# Patient Record
Sex: MALE | URBAN - METROPOLITAN AREA
[De-identification: names, ages, dates, MRNs, and addresses within clinical notes are randomized per-mention and may not be internally consistent; named-entity substitution may affect disease eponyms.]

---

## 2021-06-24 ENCOUNTER — IMMUNIZATION (OUTPATIENT)
Dept: LAB | Facility: HOSPITAL | Age: 26
End: 2021-06-24
Attending: EMERGENCY MEDICINE
Payer: COMMERCIAL

## 2021-06-24 DIAGNOSIS — Z23 NEED FOR VACCINATION: Primary | ICD-10-CM

## 2021-06-24 PROCEDURE — 0001A SARSCOV2 VAC 30MCG/0.3ML IM: CPT

## 2021-07-15 ENCOUNTER — IMMUNIZATION (OUTPATIENT)
Dept: LAB | Facility: HOSPITAL | Age: 26
End: 2021-07-15
Attending: EMERGENCY MEDICINE
Payer: COMMERCIAL

## 2021-07-15 DIAGNOSIS — Z23 NEED FOR VACCINATION: Primary | ICD-10-CM

## 2021-07-15 PROCEDURE — 0002A SARSCOV2 VAC 30MCG/0.3ML IM: CPT

## 2022-06-07 ENCOUNTER — TELEPHONE (OUTPATIENT)
Dept: INTERNAL MEDICINE | Age: 27
End: 2022-06-07

## 2022-06-07 ENCOUNTER — OFFICE VISIT (OUTPATIENT)
Dept: INTERNAL MEDICINE CLINIC | Facility: CLINIC | Age: 27
End: 2022-06-07
Payer: COMMERCIAL

## 2022-06-07 VITALS
HEART RATE: 100 BPM | OXYGEN SATURATION: 98 % | TEMPERATURE: 99 F | SYSTOLIC BLOOD PRESSURE: 122 MMHG | RESPIRATION RATE: 16 BRPM | BODY MASS INDEX: 24.44 KG/M2 | DIASTOLIC BLOOD PRESSURE: 86 MMHG | WEIGHT: 165 LBS | HEIGHT: 69 IN

## 2022-06-07 DIAGNOSIS — R07.89 MUSCULAR CHEST PAIN: ICD-10-CM

## 2022-06-07 DIAGNOSIS — Z13.0 SCREENING, IRON DEFICIENCY ANEMIA: ICD-10-CM

## 2022-06-07 DIAGNOSIS — Z13.89 SCREENING FOR GENITOURINARY CONDITION: ICD-10-CM

## 2022-06-07 DIAGNOSIS — Z13.220 LIPID SCREENING: ICD-10-CM

## 2022-06-07 DIAGNOSIS — Z00.00 ANNUAL PHYSICAL EXAM: Primary | ICD-10-CM

## 2022-06-07 DIAGNOSIS — Z00.00 LABORATORY EXAMINATION ORDERED AS PART OF A ROUTINE GENERAL MEDICAL EXAMINATION: ICD-10-CM

## 2022-06-07 DIAGNOSIS — Z13.29 THYROID DISORDER SCREEN: ICD-10-CM

## 2022-06-07 PROCEDURE — 3074F SYST BP LT 130 MM HG: CPT | Performed by: INTERNAL MEDICINE

## 2022-06-07 PROCEDURE — 3008F BODY MASS INDEX DOCD: CPT | Performed by: INTERNAL MEDICINE

## 2022-06-07 PROCEDURE — 3079F DIAST BP 80-89 MM HG: CPT | Performed by: INTERNAL MEDICINE

## 2022-06-07 PROCEDURE — 99385 PREV VISIT NEW AGE 18-39: CPT | Performed by: INTERNAL MEDICINE

## 2022-06-07 PROCEDURE — 96127 BRIEF EMOTIONAL/BEHAV ASSMT: CPT | Performed by: INTERNAL MEDICINE

## 2022-06-07 RX ORDER — NAPROXEN 500 MG/1
500 TABLET ORAL 2 TIMES DAILY WITH MEALS
Qty: 20 TABLET | Refills: 0 | Status: SHIPPED | OUTPATIENT
Start: 2022-06-07

## 2022-06-09 LAB
AMB EXT BILIRUBIN URINE: NEGATIVE
AMB EXT BILIRUBIN, TOTAL: 0.8 MG/DL
AMB EXT BLOOD URINE: NEGATIVE
AMB EXT BUN: 18 MG/DL
AMB EXT CALCIUM: 10.1
AMB EXT CARBON DIOXIDE: 24
AMB EXT CHLORIDE: 101
AMB EXT CHOLESTEROL, TOTAL: 168 MG/DL
AMB EXT CMP ALT: 13 U/L
AMB EXT CMP AST: 13 U/L
AMB EXT CREATININE: 0.94 MG/DL
AMB EXT GLUCOSE URINE: NEGATIVE
AMB EXT GLUCOSE: 95 MG/DL
AMB EXT HDL CHOLESTEROL: 43 MG/DL
AMB EXT HEMATOCRIT: 50.6
AMB EXT HEMOGLOBIN: 17.1
AMB EXT HGBA1C: 5.4 %
AMB EXT LDL CHOLESTEROL, DIRECT: 111 MG/DL
AMB EXT MCV: 87
AMB EXT NITRITE URINE: NEGATIVE
AMB EXT PH URINE: 6.5
AMB EXT PLATELETS: 277
AMB EXT POSTASSIUM: 4.5 MMOL/L
AMB EXT PROTEIN URINE: NEGATIVE
AMB EXT SODIUM: 140 MMOL/L
AMB EXT TOTAL PROTEIN: 7.7
AMB EXT TRIGLYCERIDES: 71 MG/DL
AMB EXT TSH: 0.38 MIU/ML
AMB EXT URINE CLARITY: CLEAR
AMB EXT URINE COLOR: YELLOW
AMB EXT URINE SPECIFIC GRAVITY: 1.03
AMB EXT UROBILINOGEN URINE: 1
AMB EXT WBC: 5.8 X10(3)UL

## 2022-06-10 ENCOUNTER — APPOINTMENT (OUTPATIENT)
Dept: INTERNAL MEDICINE | Age: 27
End: 2022-06-10

## 2022-06-10 PROBLEM — E05.90 SUBCLINICAL HYPERTHYROIDISM: Status: ACTIVE | Noted: 2022-06-10

## 2022-06-14 ENCOUNTER — TELEPHONE (OUTPATIENT)
Dept: INTERNAL MEDICINE CLINIC | Facility: CLINIC | Age: 27
End: 2022-06-14

## 2022-06-14 NOTE — TELEPHONE ENCOUNTER
Follow up to conversation w/ Dr. Mendy Lozada on 6/7/ office visit. Pt is requesting Note for Work about   Limited activity due to injury in left pectoral muscle. Returned to work and pt said employer would work with him but they had him return to full work load.     Please call pt when letter for work is ready for

## 2022-06-14 NOTE — TELEPHONE ENCOUNTER
Per patient, after initial visit with Dr. Ashley Rivers on June 7th patient took the next two following nights off of work. Upon going back to work at a [employer] he resumed his job duties as normal.    Patient states his has exacerbated his muscle strain pain as he is lifting and transporting very heavy packages. Patient is requesting a limited activity letter to be created where patient can only lift up to ten pounds at this time. Informed patient we can create this limited activity communication letter for . Will date the letter for effective limited activity request for one week time, however if symptoms persist patient will need to contact office for further evaluation if muscle strain has not resolved. Patient verbalized understanding and was notified letter is ready for  at his convenience.

## 2022-06-30 ENCOUNTER — OFFICE VISIT (OUTPATIENT)
Dept: INTERNAL MEDICINE CLINIC | Facility: CLINIC | Age: 27
End: 2022-06-30
Payer: COMMERCIAL

## 2022-06-30 VITALS
TEMPERATURE: 99 F | DIASTOLIC BLOOD PRESSURE: 82 MMHG | BODY MASS INDEX: 24.59 KG/M2 | SYSTOLIC BLOOD PRESSURE: 118 MMHG | OXYGEN SATURATION: 98 % | HEIGHT: 69 IN | RESPIRATION RATE: 16 BRPM | WEIGHT: 166 LBS | HEART RATE: 82 BPM

## 2022-06-30 DIAGNOSIS — R10.32 LLQ PAIN: Primary | ICD-10-CM

## 2022-06-30 PROCEDURE — 99214 OFFICE O/P EST MOD 30 MIN: CPT | Performed by: INTERNAL MEDICINE

## 2022-06-30 PROCEDURE — 3074F SYST BP LT 130 MM HG: CPT | Performed by: INTERNAL MEDICINE

## 2022-06-30 PROCEDURE — 3008F BODY MASS INDEX DOCD: CPT | Performed by: INTERNAL MEDICINE

## 2022-06-30 PROCEDURE — 3079F DIAST BP 80-89 MM HG: CPT | Performed by: INTERNAL MEDICINE

## 2022-07-07 ENCOUNTER — HOSPITAL ENCOUNTER (OUTPATIENT)
Dept: CT IMAGING | Facility: HOSPITAL | Age: 27
Discharge: HOME | End: 2022-07-07
Attending: INTERNAL MEDICINE
Payer: COMMERCIAL

## 2022-07-07 DIAGNOSIS — R10.32 LLQ PAIN: ICD-10-CM

## 2022-07-07 PROCEDURE — 74177 CT ABD & PELVIS W/CONTRAST: CPT | Performed by: INTERNAL MEDICINE

## 2025-03-20 NOTE — PROGRESS NOTES
Wellness Exam    CC: Patient is presenting for a wellness exam    HPI:   Current Complaints:   Left lower chest wall pain x few weeks. Pt states the pain is dull, worse with movement. It does not radiate. Pt does have a physical job that involves heavy lifting. Has not tried anything over the counter. Denies any N/V, blood/mucus in stool, change in bowel movements.     Pertinent Family History:   Family History   Problem Relation Age of Onset    Diabetes Mother         Last Health Maintenance Exam: 2022  Colonoscopy:  No recommendations at this time   PSA:  There are no preventive care reminders to display for this patient.   Reported Health: good    No past medical history on file.  No past surgical history on file.  Social History     Socioeconomic History    Marital status: Life Partner   Tobacco Use    Smoking status: Former    Smokeless tobacco: Never   Vaping Use    Vaping status: Never Used   Substance and Sexual Activity    Alcohol use: Yes     Comment: Rare     Drug use: Not Currently     Types: Cannabis     Comment: Carts     Social Drivers of Health      Received from [facility]    Housing Stability     Medications Ordered Prior to Encounter[1]    Review of Systems   Constitutional: Negative for fever, chills and fatigue.   HENT: Negative for hearing loss, congestion, sore throat and neck pain.    Eyes: Negative for pain and visual disturbance.   Respiratory: Negative for cough and shortness of breath.    Cardiovascular: Negative for chest pain and palpitations.   Gastrointestinal: Negative for nausea, vomiting, abdominal pain and diarrhea.   Genitourinary: Negative for urgency, frequency and difficulty urinating.   Musculoskeletal: Negative for arthralgias and gait problem. + chest wall pain  Skin: Negative for color change and rash.   Neurological: Negative for tremors, weakness and numbness.   Hematological: Negative for adenopathy. Does not bruise/bleed easily.    Psychiatric/Behavioral: Negative for confusion and agitation. The patient is not nervous/anxious.      /80   Pulse 80   Temp 98.4 °F (36.9 °C)   Resp 16   Ht 5' 9\" (1.753 m)   Wt 158 lb (71.7 kg)   SpO2 99%   BMI 23.33 kg/m²   Physical Exam   Constitutional: He is oriented to person, place, and time. He appears well-developed. No distress.   HENT: Normocephalic and atraumatic. Nose normal. TMs pearly gray, + light reflex.  Mucous membranes moist, dentition normal.  Oropharynx without erythema, exudate or tonsillar hypertrophy  Eyes: EOM are normal. Pupils are equal, round, and reactive to light. No scleral icterus. Fundoscopic exam: Red reflex b/l. No exudates, hemorrhages, a/v nicking, or papilledema seen b/l.  Neck: Normal range of motion. No thyromegaly present.   Cardiovascular: Normal rate, regular rhythm and normal heart sounds.  Exam reveals no friction rub, no murmur heard.  Pulmonary/Chest: Effort normal and breath sounds normal b/l. He has no wheezes or rales.   Abdominal: Soft. Bowel sounds are normal. There is no tenderness. No HSM.  Abdominal aorta normal in size, no hernia appreciated.  Musculoskeletal: Normal range of motion. He exhibits no edema. +Reproducible pain at the left lower chest wall on palpation  Lymphadenopathy: He has no cervical or supraclavicular adenopathy.   : deferred  Neurological: He is alert and oriented to person, place, and time.  DTRs +2 and symmetric b/l.   Skin: Skin is warm. No rash noted. No erythema, pallor or jaundice.   Psychiatric: He has a normal mood and affect. His behavior is normal.       Assessment and Plan:  Emmanuel Reyes is a 29 year old male here for a wellness exam  Age appropriate cancer screening, labs, safety, immunizations were discussed with the patient and ordered as follows:    Hamzah was seen today for abdominal pain.    Diagnoses and all orders for this visit:    Annual physical exam    Laboratory exam ordered as part of routine  general medical examination  -     CBC With Differential With Platelet  -     Comp Metabolic Panel (14)  -     Lipid Panel  -     TSH W Reflex To Free T4; Future  -     Urinalysis, Routine    Encounter for vitamin deficiency screening  -     Vitamin D; Future    Muscular chest pain  -     naproxen 500 MG Oral Tab; Take 1 tablet (500 mg total) by mouth 2 (two) times daily with meals.   -if pain persist after the treatment pt is instructed to return to the office.   Pt declines TDAP vaccine at this visit.  Orders Placed This Encounter   Procedures    CBC With Differential With Platelet    Comp Metabolic Panel (14)    Lipid Panel    TSH W Reflex To Free T4     Standing Status:   Future     Standing Expiration Date:   3/16/2026    Vitamin D     Standing Status:   Future     Standing Expiration Date:   3/21/2026     Order Specific Question:   Please pick the scenario that best fits the purpose for ordering this test     Answer:   General Screening/Vit D deficiency (25-Hydroxy)     Order Specific Question:   Release to patient     Answer:   Immediate    Urinalysis, Routine      Health Maintenance Due   Topic Date Due    DTaP,Tdap,and Td Vaccines (7 - Td or Tdap) 07/22/2018    Annual Physical  06/07/2023    COVID-19 Vaccine (3 - 2024-25 season) 09/01/2024    Influenza Vaccine (1) Never done    Annual Depression Screening  Never done       His 5 year prevention plan includes: annual physical and labs. 30 mins exercise most days of the week and heart healthy diet   Patient/Caregiver Education:  Patient/Caregiver Education: There are no barriers to learning. Medical education done.  Outcome: Patient verbalizes understanding.      Return in 12m for annual physical.  Educated by: MICHAEL Hernandez           [1]   No current outpatient medications on file prior to visit.     No current facility-administered medications on file prior to visit.

## 2025-03-21 ENCOUNTER — OFFICE VISIT (OUTPATIENT)
Dept: INTERNAL MEDICINE CLINIC | Facility: CLINIC | Age: 30
End: 2025-03-21
Payer: COMMERCIAL

## 2025-03-21 ENCOUNTER — TELEPHONE (OUTPATIENT)
Dept: INTERNAL MEDICINE CLINIC | Facility: CLINIC | Age: 30
End: 2025-03-21

## 2025-03-21 VITALS
BODY MASS INDEX: 23.4 KG/M2 | HEART RATE: 80 BPM | HEIGHT: 69 IN | SYSTOLIC BLOOD PRESSURE: 120 MMHG | TEMPERATURE: 98 F | OXYGEN SATURATION: 99 % | DIASTOLIC BLOOD PRESSURE: 80 MMHG | WEIGHT: 158 LBS | RESPIRATION RATE: 16 BRPM

## 2025-03-21 DIAGNOSIS — Z00.00 LABORATORY EXAM ORDERED AS PART OF ROUTINE GENERAL MEDICAL EXAMINATION: Primary | ICD-10-CM

## 2025-03-21 DIAGNOSIS — R07.89 MUSCULAR CHEST PAIN: ICD-10-CM

## 2025-03-21 DIAGNOSIS — E55.9 VITAMIN D DEFICIENCY: ICD-10-CM

## 2025-03-21 DIAGNOSIS — Z28.21 REFUSES TETANUS, DIPHTHERIA, AND ACELLULAR PERTUSSIS (TDAP) VACCINATION: ICD-10-CM

## 2025-03-21 DIAGNOSIS — Z13.21 ENCOUNTER FOR VITAMIN DEFICIENCY SCREENING: ICD-10-CM

## 2025-03-21 DIAGNOSIS — Z00.00 LABORATORY EXAM ORDERED AS PART OF ROUTINE GENERAL MEDICAL EXAMINATION: ICD-10-CM

## 2025-03-21 DIAGNOSIS — Z00.00 ANNUAL PHYSICAL EXAM: Primary | ICD-10-CM

## 2025-03-21 RX ORDER — NAPROXEN 500 MG/1
500 TABLET ORAL 2 TIMES DAILY WITH MEALS
Qty: 28 TABLET | Refills: 0 | Status: SHIPPED | OUTPATIENT
Start: 2025-03-21

## 2025-03-22 DIAGNOSIS — D75.1 ERYTHROCYTOSIS: Primary | ICD-10-CM

## 2025-03-22 LAB
ABSOLUTE BASOPHILS: 37 CELLS/UL (ref 0–200)
ABSOLUTE EOSINOPHILS: 73 CELLS/UL (ref 15–500)
ABSOLUTE LYMPHOCYTES: 2241 CELLS/UL (ref 850–3900)
ABSOLUTE MONOCYTES: 701 CELLS/UL (ref 200–950)
ABSOLUTE NEUTROPHILS: 4249 CELLS/UL (ref 1500–7800)
ALBUMIN/GLOBULIN RATIO: 1.9 (CALC) (ref 1–2.5)
ALBUMIN: 4.9 G/DL (ref 3.6–5.1)
ALKALINE PHOSPHATASE: 69 U/L (ref 36–130)
ALT: 14 U/L (ref 9–46)
AST: 15 U/L (ref 10–40)
BASOPHILS: 0.5 %
BILIRUBIN, TOTAL: 0.7 MG/DL (ref 0.2–1.2)
BILIRUBIN: NEGATIVE
BUN: 21 MG/DL (ref 7–25)
CALCIUM: 10 MG/DL (ref 8.6–10.3)
CARBON DIOXIDE: 28 MMOL/L (ref 20–32)
CHLORIDE: 102 MMOL/L (ref 98–110)
CHOL/HDLC RATIO: 3.5 (CALC)
CHOLESTEROL, TOTAL: 176 MG/DL
CREATININE: 0.82 MG/DL (ref 0.6–1.26)
EGFR: 121 ML/MIN/1.73M2
EOSINOPHILS: 1 %
GLOBULIN: 2.6 G/DL (CALC) (ref 1.9–3.7)
GLUCOSE: 99 MG/DL (ref 65–99)
GLUCOSE: NEGATIVE
HDL CHOLESTEROL: 51 MG/DL
HEMATOCRIT: 51.3 % (ref 38.5–50)
HEMOGLOBIN: 17.3 G/DL (ref 13.2–17.1)
LDL-CHOLESTEROL: 106 MG/DL (CALC)
LYMPHOCYTES: 30.7 %
MCH: 29.4 PG (ref 27–33)
MCHC: 33.7 G/DL (ref 32–36)
MCV: 87.1 FL (ref 80–100)
MONOCYTES: 9.6 %
MPV: 9.3 FL (ref 7.5–12.5)
NEUTROPHILS: 58.2 %
NITRITE: NEGATIVE
NON-HDL CHOLESTEROL: 125 MG/DL (CALC)
OCCULT BLOOD: NEGATIVE
PH: 7.5 (ref 5–8)
PLATELET COUNT: 285 THOUSAND/UL (ref 140–400)
POTASSIUM: 4 MMOL/L (ref 3.5–5.3)
PROTEIN, TOTAL: 7.5 G/DL (ref 6.1–8.1)
RDW: 12.7 % (ref 11–15)
RED BLOOD CELL COUNT: 5.89 MILLION/UL (ref 4.2–5.8)
SODIUM: 141 MMOL/L (ref 135–146)
SPECIFIC GRAVITY: 1.03 (ref 1–1.03)
TRIGLYCERIDES: 97 MG/DL
TSH W/REFLEX TO FT4: 0.54 MIU/L (ref 0.4–4.5)
VITAMIN D, 25-OH, TOTAL: 21 NG/ML (ref 30–100)
WHITE BLOOD CELL COUNT: 7.3 THOUSAND/UL (ref 3.8–10.8)

## 2025-04-03 NOTE — PROGRESS NOTES
Emmanuel Reyes is a 29 year old male.  HPI:   HPI   Pt presents today with recurrent left upper abdomen x months.   Denies change in bowel movements, blood/mucus in stool.   Completed two weeks of Naproxen thinking it could be muscular related; but it did not provided relief.     Current Outpatient Medications   Medication Sig Dispense Refill    naproxen 500 MG Oral Tab Take 1 tablet (500 mg total) by mouth 2 (two) times daily with meals. 28 tablet 0      History reviewed. No pertinent past medical history.   Social History:  Social History     Socioeconomic History    Marital status: Life Partner   Tobacco Use    Smoking status: Former    Smokeless tobacco: Never   Vaping Use    Vaping status: Never Used   Substance and Sexual Activity    Alcohol use: Yes     Comment: Rare     Drug use: Not Currently     Types: Cannabis     Comment: Carts     Social Drivers of Health      Received from [facility]    Housing Stability        REVIEW OF SYSTEMS:   Review of Systems   Constitutional: Negative.    Respiratory: Negative.     Cardiovascular: Negative.    Gastrointestinal:  Positive for abdominal pain. Negative for abdominal distention, anal bleeding, blood in stool, constipation, diarrhea, nausea, rectal pain and vomiting.   Genitourinary: Negative.    Skin: Negative.    Psychiatric/Behavioral: Negative.           EXAM:   /82   Pulse 73   Temp 97.9 °F (36.6 °C)   Resp 18   Ht 5' 9\" (1.753 m)   Wt 161 lb (73 kg)   SpO2 99%   BMI 23.78 kg/m²   Physical Exam  Vitals and nursing note reviewed.   Constitutional:       Appearance: Normal appearance. He is normal weight.   Cardiovascular:      Rate and Rhythm: Normal rate and regular rhythm.      Pulses: Normal pulses.      Heart sounds: Normal heart sounds.   Pulmonary:      Effort: Pulmonary effort is normal.      Breath sounds: Normal breath sounds.   Abdominal:      General: Abdomen is flat. Bowel sounds are normal. There is no distension.       Palpations: Abdomen is soft. There is no mass.      Tenderness: There is abdominal tenderness in the left upper quadrant. There is no right CVA tenderness, left CVA tenderness, guarding or rebound. Negative signs include Cheng's sign, Rovsing's sign, McBurney's sign, psoas sign and obturator sign.      Hernia: No hernia is present.   Skin:     General: Skin is warm and dry.   Neurological:      General: No focal deficit present.      Mental Status: He is alert and oriented to person, place, and time. Mental status is at baseline.   Psychiatric:         Mood and Affect: Mood normal.         Behavior: Behavior normal.         Thought Content: Thought content normal.         Judgment: Judgment normal.          ASSESSMENT AND PLAN:   Diagnoses and all orders for this visit:    Left upper quadrant abdominal pain  -     CT ABDOMEN+PELVIS(CONTRAST ONLY)(CPT=74177); Future  -     Gastro Referral - In Network       Requested Prescriptions      No prescriptions requested or ordered in this encounter         The patient indicates understanding of these issues and agrees to the plan.  The patient is asked to return if symptoms persist or worsen.

## 2025-04-04 ENCOUNTER — OFFICE VISIT (OUTPATIENT)
Dept: INTERNAL MEDICINE CLINIC | Facility: CLINIC | Age: 30
End: 2025-04-04
Payer: COMMERCIAL

## 2025-04-04 VITALS
WEIGHT: 161 LBS | RESPIRATION RATE: 18 BRPM | OXYGEN SATURATION: 99 % | BODY MASS INDEX: 23.85 KG/M2 | TEMPERATURE: 98 F | DIASTOLIC BLOOD PRESSURE: 82 MMHG | HEIGHT: 69 IN | HEART RATE: 73 BPM | SYSTOLIC BLOOD PRESSURE: 128 MMHG

## 2025-04-04 DIAGNOSIS — R10.12 LEFT UPPER QUADRANT ABDOMINAL PAIN: Primary | ICD-10-CM

## 2025-04-04 PROCEDURE — 3079F DIAST BP 80-89 MM HG: CPT

## 2025-04-04 PROCEDURE — 3074F SYST BP LT 130 MM HG: CPT

## 2025-04-04 PROCEDURE — G2211 COMPLEX E/M VISIT ADD ON: HCPCS

## 2025-04-04 PROCEDURE — 3008F BODY MASS INDEX DOCD: CPT

## 2025-04-04 PROCEDURE — 99214 OFFICE O/P EST MOD 30 MIN: CPT

## 2025-04-16 ENCOUNTER — TELEPHONE (OUTPATIENT)
Dept: CASE MANAGEMENT | Age: 30
End: 2025-04-16

## 2025-04-20 ENCOUNTER — HOSPITAL ENCOUNTER (OUTPATIENT)
Dept: CT IMAGING | Age: 30
Discharge: HOME | End: 2025-04-20
Payer: COMMERCIAL

## 2025-04-20 ENCOUNTER — HOSPITAL ENCOUNTER (OUTPATIENT)
Dept: CT IMAGING | Age: 30
End: 2025-04-20
Payer: COMMERCIAL

## 2025-04-20 DIAGNOSIS — R10.12 LEFT UPPER QUADRANT ABDOMINAL PAIN: ICD-10-CM

## 2025-04-20 PROCEDURE — 74177 CT ABD & PELVIS W/CONTRAST: CPT

## (undated) NOTE — LETTER
06/14/22    [place]  YOB: 1995      To Whom This May Concern:      Patient is currently under my care and was see in office for left pectoral muscle strain on June 7th. Please limit above individual to limited activity, patient has ability to lift and move boxes up to ten pounds at this time until the muscle strain has resolved.  This limited activity may be discontinued on June 21st.    Sincerely    Nuzhat Welch MD